# Patient Record
Sex: MALE | Race: WHITE | NOT HISPANIC OR LATINO
[De-identification: names, ages, dates, MRNs, and addresses within clinical notes are randomized per-mention and may not be internally consistent; named-entity substitution may affect disease eponyms.]

---

## 2021-10-15 PROBLEM — Z00.00 ENCOUNTER FOR PREVENTIVE HEALTH EXAMINATION: Status: ACTIVE | Noted: 2021-10-15

## 2021-10-18 ENCOUNTER — APPOINTMENT (OUTPATIENT)
Dept: CARDIOLOGY | Facility: CLINIC | Age: 76
End: 2021-10-18

## 2022-09-15 ENCOUNTER — OUTPATIENT (OUTPATIENT)
Dept: OUTPATIENT SERVICES | Facility: HOSPITAL | Age: 77
LOS: 1 days | Discharge: HOME | End: 2022-09-15

## 2022-09-15 DIAGNOSIS — R05.2 SUBACUTE COUGH: ICD-10-CM

## 2022-09-15 PROCEDURE — 71046 X-RAY EXAM CHEST 2 VIEWS: CPT | Mod: 26

## 2023-11-09 ENCOUNTER — OUTPATIENT (OUTPATIENT)
Dept: OUTPATIENT SERVICES | Facility: HOSPITAL | Age: 78
LOS: 1 days | End: 2023-11-09
Payer: MEDICARE

## 2023-11-09 DIAGNOSIS — M54.9 DORSALGIA, UNSPECIFIED: ICD-10-CM

## 2023-11-09 PROCEDURE — 72110 X-RAY EXAM L-2 SPINE 4/>VWS: CPT

## 2023-11-09 PROCEDURE — 72110 X-RAY EXAM L-2 SPINE 4/>VWS: CPT | Mod: 26

## 2023-11-10 DIAGNOSIS — M54.9 DORSALGIA, UNSPECIFIED: ICD-10-CM

## 2023-11-11 ENCOUNTER — TRANSCRIPTION ENCOUNTER (OUTPATIENT)
Age: 78
End: 2023-11-11

## 2024-07-26 ENCOUNTER — APPOINTMENT (OUTPATIENT)
Dept: CARDIOLOGY | Facility: CLINIC | Age: 79
End: 2024-07-26

## 2024-08-30 ENCOUNTER — APPOINTMENT (OUTPATIENT)
Dept: CARDIOLOGY | Facility: CLINIC | Age: 79
End: 2024-08-30
Payer: MEDICARE

## 2024-08-30 VITALS
WEIGHT: 163 LBS | SYSTOLIC BLOOD PRESSURE: 120 MMHG | BODY MASS INDEX: 24.71 KG/M2 | HEART RATE: 59 BPM | DIASTOLIC BLOOD PRESSURE: 70 MMHG | HEIGHT: 68 IN

## 2024-08-30 DIAGNOSIS — Z78.9 OTHER SPECIFIED HEALTH STATUS: ICD-10-CM

## 2024-08-30 DIAGNOSIS — Z01.810 ENCOUNTER FOR PREPROCEDURAL CARDIOVASCULAR EXAMINATION: ICD-10-CM

## 2024-08-30 DIAGNOSIS — Z87.891 PERSONAL HISTORY OF NICOTINE DEPENDENCE: ICD-10-CM

## 2024-08-30 DIAGNOSIS — Z83.3 FAMILY HISTORY OF DIABETES MELLITUS: ICD-10-CM

## 2024-08-30 DIAGNOSIS — E78.5 HYPERLIPIDEMIA, UNSPECIFIED: ICD-10-CM

## 2024-08-30 DIAGNOSIS — I95.9 HYPOTENSION, UNSPECIFIED: ICD-10-CM

## 2024-08-30 PROCEDURE — 93000 ELECTROCARDIOGRAM COMPLETE: CPT

## 2024-08-30 PROCEDURE — 99204 OFFICE O/P NEW MOD 45 MIN: CPT | Mod: 25

## 2024-08-30 RX ORDER — VITAMIN K2 90 MCG
1000 CAPSULE ORAL
Refills: 0 | Status: ACTIVE | COMMUNITY

## 2024-08-30 RX ORDER — CYANOCOBALAMIN (VITAMIN B-12) 1000 MCG
1000 TABLET, EXTENDED RELEASE ORAL DAILY
Refills: 0 | Status: ACTIVE | COMMUNITY

## 2024-08-30 NOTE — HISTORY OF PRESENT ILLNESS
[FreeTextEntry1] : 79-year-old male former smoker (35 years ago) PMHx: kidney cancer (5% right kidney removal), colon resection due to 3 polyps that were precancerous, bladder stones, inguinal hernia  Patient presents to establish cardiac care. Patient previously saw a cardiologist a few years ago for palpitations, resolved on their own. Pt wants to be evaluated for episodes of low blood pressures 100/60s, not symptomatic. BP at home usually 100-120s/60-70s. Denies CP, SOB, palpitations, dizziness, LE swelling. Pt exercises about twice a week.   7/17/24: GFR 51  HDL 90  Trig 52 TSH 1.03 A1C 5.9.

## 2024-08-30 NOTE — ASSESSMENT
[FreeTextEntry1] : 79-year-old male former smoker (35 years ago) PMHx: kidney cancer (5% right kidney removal), colon resection due to 3 polyps that were precancerous, bladder stones, inguinal hernia. Hyperlipidemia. Episodes of low BP, asymptomatic. Preoperative evaluation for hernia repair.  Plan: Low-fat diet discussed. Exercise stress echocardiogram. Carotid Duplex. F/u after the tests.  Ramón Urbano MD

## 2024-09-04 ENCOUNTER — APPOINTMENT (OUTPATIENT)
Dept: CARDIOLOGY | Facility: CLINIC | Age: 79
End: 2024-09-04
Payer: MEDICARE

## 2024-09-04 DIAGNOSIS — R00.2 PALPITATIONS: ICD-10-CM

## 2024-09-04 PROCEDURE — 93880 EXTRACRANIAL BILAT STUDY: CPT

## 2024-09-04 PROCEDURE — 93306 TTE W/DOPPLER COMPLETE: CPT

## 2024-09-04 PROCEDURE — 93015 CV STRESS TEST SUPVJ I&R: CPT

## 2024-09-10 PROBLEM — R00.2 PALPITATION: Status: ACTIVE | Noted: 2024-09-10

## 2024-09-12 ENCOUNTER — APPOINTMENT (OUTPATIENT)
Dept: CARDIOLOGY | Facility: CLINIC | Age: 79
End: 2024-09-12
Payer: MEDICARE

## 2024-09-12 VITALS
SYSTOLIC BLOOD PRESSURE: 122 MMHG | HEART RATE: 60 BPM | BODY MASS INDEX: 24.86 KG/M2 | DIASTOLIC BLOOD PRESSURE: 74 MMHG | WEIGHT: 164 LBS | HEIGHT: 68 IN

## 2024-09-12 DIAGNOSIS — E78.5 HYPERLIPIDEMIA, UNSPECIFIED: ICD-10-CM

## 2024-09-12 DIAGNOSIS — I95.9 HYPOTENSION, UNSPECIFIED: ICD-10-CM

## 2024-09-12 DIAGNOSIS — Z01.810 ENCOUNTER FOR PREPROCEDURAL CARDIOVASCULAR EXAMINATION: ICD-10-CM

## 2024-09-12 PROCEDURE — 99214 OFFICE O/P EST MOD 30 MIN: CPT | Mod: 25

## 2024-09-12 PROCEDURE — 93000 ELECTROCARDIOGRAM COMPLETE: CPT

## 2024-09-12 NOTE — ASSESSMENT
[FreeTextEntry1] : 79-year-old male former smoker (35 years ago) PMHx: kidney cancer (5% right kidney removal), colon resection due to 3 polyps that were precancerous, bladder stones, inguinal hernia. Hyperlipidemia. Episodes of low BP, asymptomatic. Normal BP repeatedly here. Exercise capacity 10.5 METs, normal LVEF, no evidence of ischemia at 79% of MHR. Low risk for cardiovascular complications of inguinal hernia repair.  Plan: Low-fat diet discussed. Proceed with surgery (no BB, no SBE prophylaxis). F/u as needed.  Ramón Urbano MD